# Patient Record
(demographics unavailable — no encounter records)

---

## 2024-10-19 NOTE — DISCUSSION/SUMMARY
[] : The components of the vaccine(s) to be administered today are listed in the plan of care. The disease(s) for which the vaccine(s) are intended to prevent and the risks have been discussed with the caretaker.  The risks are also included in the appropriate vaccination information statements which have been provided to the patient's caregiver.  The caregiver has given consent to vaccinate. [FreeTextEntry1] : 6 y/o F presents for Flu vaccine.
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
## 2025-06-04 NOTE — END OF VISIT
[FreeTextEntry3] : This note was written by Ge England on 06/04/2025 acting solely as a scribe for Dr. Alfred Arenas.   All medical record entries made by the Scribe were at my, Dr. Alfred Arenas, direction and personally dictated by me on 06/04/2025. I have personally reviewed the chart and agree that the record accurately reflects my personal performance of the history, physical exam, assessment and plan.

## 2025-06-04 NOTE — ADDENDUM
[FreeTextEntry1] :  I, Ge England, acted solely as a scribe for Dr. Arenas on this date on 06/04/2025.

## 2025-06-04 NOTE — PROCEDURE
[FreeTextEntry1] : She was placed into a well-padded and well-molded right short arm thumb spica fiberglass waterproof cast. She was instructed on cast care, elevation and range of motion exercises to the digits. She will follow-up in 16 days for x-rays out of plaster.
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
## 2025-06-04 NOTE — PHYSICAL EXAM
[de-identified] : - Constitutional: This is a healthy appearing young female. She is accompanied by her father.  - Psych: Patient is alert and oriented to person, place and time.  Patient has a normal mood and affect. - Cardiovascular: Normal pulses throughout the upper extremities.    ---  Examination of her right thumb demonstrates mild swelling and tenderness along the base of the thumb metacarpal.  Her skin is intact.  There is no obvious deformity.  She is neurovascularly intact distally. [de-identified] : I reviewed x-rays of bilateral thumbs dated 6/3/2025 which demonstrate a minimally displaced right thumb salter Strange type 2 fracture at the ulnar base of the metacarpal.

## 2025-06-09 NOTE — DISCUSSION/SUMMARY
[FreeTextEntry1] : I had a discussion regarding today's visit, the diagnosis and treatment recommendations and options.  We also discussed changes since the last visit.  At this time, I recommended   The patient and their mother have agreed to this plan of management and have expressed full understanding.  All questions were fully answered to their satisfaction.  My cumulative time spent on today's visit included: Preparation for the visit, review of the medical records, review of pertinent diagnostic studies, examination and counseling of the patient and the mother on the above diagnosis, treatment plan and prognosis, orders of diagnostic tests, medications and/or appropriate procedures and documentation in the medical records of today's visit.
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
## 2025-06-09 NOTE — PHYSICAL EXAM
[de-identified] : - Constitutional: This is a healthy appearing young female. She is accompanied by her father.  - Psych: Patient is alert and oriented to person, place and time.  Patient has a normal mood and affect. - Cardiovascular: Normal pulses throughout the upper extremities.    ---  Examination of her right thumb after the cast was removed demonstrates decreased swelling.  She is neurovascularly intact distally. [de-identified] : AP, lateral oblique radiographs of her right thumb demonstrate her thumb metacarpal base Salter-Strange type II fracture to be

## 2025-06-20 NOTE — PHYSICAL EXAM
[de-identified] : AP, lateral oblique radiographs of her right thumb demonstrate her thumb metacarpal base Salter-Strange type II fracture to be furthered healed in good alignment. [de-identified] : - Constitutional: This is a healthy appearing young female. She is accompanied by her father.  - Psych: Patient is alert and oriented to person, place and time.  Patient has a normal mood and affect. - Cardiovascular: Normal pulses throughout the upper extremities.    ---  Examination of her right thumb after the cast was removed demonstrates no residual swelling.  There is no residual tenderness along the base of the metacarpal.  She has essentially full flexion and extension.  She is neurovascularly intact distally.

## 2025-06-20 NOTE — PHYSICAL EXAM
[de-identified] : AP, lateral oblique radiographs of her right thumb demonstrate her thumb metacarpal base Salter-Strange type II fracture to be furthered healed in good alignment. [de-identified] : - Constitutional: This is a healthy appearing young female. She is accompanied by her father.  - Psych: Patient is alert and oriented to person, place and time.  Patient has a normal mood and affect. - Cardiovascular: Normal pulses throughout the upper extremities.    ---  Examination of her right thumb after the cast was removed demonstrates no residual swelling.  There is no residual tenderness along the base of the metacarpal.  She has essentially full flexion and extension.  She is neurovascularly intact distally.
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
## 2025-06-20 NOTE — DISCUSSION/SUMMARY
[FreeTextEntry1] : I had a discussion regarding today's visit, the diagnosis and treatment recommendations and options.  We also discussed changes since the last visit.  At this time, as her fracture is nearly healed, she can remain out of the cast.  I did tell her that she is cleared to resume activities on Monday. She will follow up on an as needed basis.  The patient and their mother have agreed to this plan of management and have expressed full understanding.  All questions were fully answered to their satisfaction.  My cumulative time spent on today's visit included: Preparation for the visit, review of the medical records, review of pertinent diagnostic studies, examination and counseling of the patient and the mother on the above diagnosis, treatment plan and prognosis, orders of diagnostic tests, medications and/or appropriate procedures and documentation in the medical records of today's visit.